# Patient Record
Sex: MALE | Race: WHITE | NOT HISPANIC OR LATINO | Employment: STUDENT | URBAN - METROPOLITAN AREA
[De-identification: names, ages, dates, MRNs, and addresses within clinical notes are randomized per-mention and may not be internally consistent; named-entity substitution may affect disease eponyms.]

---

## 2020-08-27 ENCOUNTER — OFFICE VISIT (OUTPATIENT)
Dept: FAMILY MEDICINE CLINIC | Facility: CLINIC | Age: 25
End: 2020-08-27
Payer: COMMERCIAL

## 2020-08-27 VITALS
HEART RATE: 60 BPM | WEIGHT: 160 LBS | HEIGHT: 71 IN | SYSTOLIC BLOOD PRESSURE: 120 MMHG | DIASTOLIC BLOOD PRESSURE: 80 MMHG | OXYGEN SATURATION: 100 % | BODY MASS INDEX: 22.4 KG/M2

## 2020-08-27 DIAGNOSIS — E73.9 LACTOSE INTOLERANCE: ICD-10-CM

## 2020-08-27 DIAGNOSIS — K58.0 IRRITABLE BOWEL SYNDROME WITH DIARRHEA: ICD-10-CM

## 2020-08-27 DIAGNOSIS — K59.1 FUNCTIONAL DIARRHEA: Primary | ICD-10-CM

## 2020-08-27 PROCEDURE — 3008F BODY MASS INDEX DOCD: CPT | Performed by: FAMILY MEDICINE

## 2020-08-27 PROCEDURE — 3725F SCREEN DEPRESSION PERFORMED: CPT | Performed by: FAMILY MEDICINE

## 2020-08-27 PROCEDURE — 1036F TOBACCO NON-USER: CPT | Performed by: FAMILY MEDICINE

## 2020-08-27 PROCEDURE — 99203 OFFICE O/P NEW LOW 30 MIN: CPT | Performed by: FAMILY MEDICINE

## 2020-08-27 NOTE — PROGRESS NOTES
Assessment/Plan: patient go for studies as listed below  Patient use Metamucil daily  Follow-up in 1 month  Diagnoses and all orders for this visit:    Functional diarrhea  -     Comprehensive metabolic panel; Future  -     CBC and differential; Future  -     TSH, 3rd generation with Free T4 reflex; Future  -     Celiac Disease Antibody Profile; Future  -     Inflammatory bowel disease panel; Future  -     Lipase; Future  -     UA w Reflex to Microscopic w Reflex to Culture -Lab Collect  -     5 HIAA, urine, quantitative, 24 hour; Future  -     Stool culture; Future  -     White Blood Cells, Stool by Gram Stain; Future  -     H  pylori antigen, stool; Future  -     Ova and parasite examination; Future  -     Clostridium difficile toxin by PCR; Future    Irritable bowel syndrome with diarrhea  -     Comprehensive metabolic panel; Future  -     CBC and differential; Future  -     TSH, 3rd generation with Free T4 reflex; Future  -     Celiac Disease Antibody Profile; Future  -     Inflammatory bowel disease panel; Future  -     Lipase; Future  -     UA w Reflex to Microscopic w Reflex to Culture -Lab Collect  -     5 HIAA, urine, quantitative, 24 hour; Future  -     Stool culture; Future  -     White Blood Cells, Stool by Gram Stain; Future  -     H  pylori antigen, stool; Future  -     Ova and parasite examination; Future  -     Clostridium difficile toxin by PCR; Future    Lactose intolerance  -     Comprehensive metabolic panel; Future  -     CBC and differential; Future  -     TSH, 3rd generation with Free T4 reflex; Future  -     Celiac Disease Antibody Profile; Future  -     Inflammatory bowel disease panel; Future  -     Lipase; Future  -     UA w Reflex to Microscopic w Reflex to Culture -Lab Collect  -     5 HIAA, urine, quantitative, 24 hour; Future  -     Stool culture; Future  -     White Blood Cells, Stool by Gram Stain; Future  -     H  pylori antigen, stool;  Future  -     Ova and parasite examination; Future  -     Clostridium difficile toxin by PCR; Future            Subjective:        Patient ID: Gonzalo Hernandez is a 27 y o  male  Patient is here as a new patient to establish care  Past medical history surgical history allergies medications family history and social history all reviewed at the present time  Patient with history of lactose intolerance  Patient with watery diarrhea  Patient with lower abdominal pain bilaterally  This occurs with bowel movements  The occasional vomiting noted  No nausea  No other chest pain shortness of breath or difficulty with urination  No hematochezia  Urination is normal   Patient does use Imodium intermittently  Tums use occasionally  Patient will sometimes have multiple bowel movements in the same day  Patient's father with celiac disease  The no colon cancer  The following portions of the patient's history were reviewed and updated as appropriate: allergies, current medications, past family history, past medical history, past social history, past surgical history and problem list       Review of Systems   Constitutional: Negative  HENT: Negative  Eyes: Negative  Respiratory: Negative  Cardiovascular: Negative  Gastrointestinal: Positive for abdominal pain, diarrhea, nausea and vomiting  Endocrine: Negative  Genitourinary: Negative  Musculoskeletal: Negative  Skin: Negative  Allergic/Immunologic: Negative  Neurological: Negative  Hematological: Negative  Psychiatric/Behavioral: Negative  Objective:      BMI Counseling: Body mass index is 22 32 kg/m²  The BMI is above normal  Nutrition recommendations include decreasing portion sizes  Exercise recommendations include moderate physical activity 150 minutes/week  Depression Screening and Follow-up Plan: Patient's depression screening was positive with a PHQ-2 score of 0  Clincally patient does not have depression   No treatment is required  /80 (BP Location: Left arm, Patient Position: Sitting, Cuff Size: Standard)   Pulse 60   Ht 5' 11" (1 803 m)   Wt 72 6 kg (160 lb)   SpO2 100%   BMI 22 32 kg/m²          Physical Exam  Vitals signs and nursing note reviewed  Constitutional:       General: He is not in acute distress  Appearance: Normal appearance  He is well-developed  He is not diaphoretic  HENT:      Head: Normocephalic and atraumatic  Right Ear: Tympanic membrane, ear canal and external ear normal       Left Ear: Tympanic membrane, ear canal and external ear normal       Nose: Nose normal  No congestion  Eyes:      General: No scleral icterus  Right eye: No discharge  Left eye: No discharge  Pupils: Pupils are equal, round, and reactive to light  Neck:      Musculoskeletal: Normal range of motion and neck supple  Thyroid: No thyromegaly  Cardiovascular:      Rate and Rhythm: Normal rate and regular rhythm  Heart sounds: Normal heart sounds  No murmur  No friction rub  No gallop  Pulmonary:      Effort: Pulmonary effort is normal  No respiratory distress  Breath sounds: Normal breath sounds  No wheezing or rales  Chest:      Chest wall: No tenderness  Abdominal:      General: Bowel sounds are normal  There is no distension  Palpations: Abdomen is soft  Tenderness: There is no abdominal tenderness  There is no guarding or rebound  Musculoskeletal: Normal range of motion  General: No tenderness  Lymphadenopathy:      Cervical: No cervical adenopathy  Skin:     General: Skin is warm and dry  Capillary Refill: Capillary refill takes 2 to 3 seconds  Coloration: Skin is not pale  Findings: No erythema or rash  Neurological:      General: No focal deficit present  Mental Status: He is alert and oriented to person, place, and time  Cranial Nerves: No cranial nerve deficit  Motor: No abnormal muscle tone  Coordination: Coordination normal    Psychiatric:         Mood and Affect: Mood normal          Behavior: Behavior normal          Thought Content:  Thought content normal          Judgment: Judgment normal

## 2020-09-08 ENCOUNTER — APPOINTMENT (OUTPATIENT)
Dept: LAB | Facility: MEDICAL CENTER | Age: 25
End: 2020-09-08
Payer: COMMERCIAL

## 2020-09-08 DIAGNOSIS — K59.1 FUNCTIONAL DIARRHEA: ICD-10-CM

## 2020-09-08 DIAGNOSIS — E73.9 LACTOSE INTOLERANCE: ICD-10-CM

## 2020-09-08 DIAGNOSIS — K58.0 IRRITABLE BOWEL SYNDROME WITH DIARRHEA: ICD-10-CM

## 2020-09-08 LAB
ALBUMIN SERPL BCP-MCNC: 4.4 G/DL (ref 3.5–5)
ALP SERPL-CCNC: 46 U/L (ref 46–116)
ALT SERPL W P-5'-P-CCNC: 23 U/L (ref 12–78)
ANION GAP SERPL CALCULATED.3IONS-SCNC: 5 MMOL/L (ref 4–13)
AST SERPL W P-5'-P-CCNC: 16 U/L (ref 5–45)
BASOPHILS # BLD AUTO: 0.06 THOUSANDS/ΜL (ref 0–0.1)
BASOPHILS NFR BLD AUTO: 1 % (ref 0–1)
BILIRUB SERPL-MCNC: 0.39 MG/DL (ref 0.2–1)
BILIRUB UR QL STRIP: NEGATIVE
BUN SERPL-MCNC: 8 MG/DL (ref 5–25)
CALCIUM SERPL-MCNC: 9.7 MG/DL (ref 8.3–10.1)
CHLORIDE SERPL-SCNC: 108 MMOL/L (ref 100–108)
CLARITY UR: CLEAR
CO2 SERPL-SCNC: 29 MMOL/L (ref 21–32)
COLOR UR: YELLOW
CREAT SERPL-MCNC: 0.91 MG/DL (ref 0.6–1.3)
EOSINOPHIL # BLD AUTO: 0.23 THOUSAND/ΜL (ref 0–0.61)
EOSINOPHIL NFR BLD AUTO: 5 % (ref 0–6)
ERYTHROCYTE [DISTWIDTH] IN BLOOD BY AUTOMATED COUNT: 11.7 % (ref 11.6–15.1)
GFR SERPL CREATININE-BSD FRML MDRD: 119 ML/MIN/1.73SQ M
GLUCOSE SERPL-MCNC: 93 MG/DL (ref 65–140)
GLUCOSE UR STRIP-MCNC: NEGATIVE MG/DL
HCT VFR BLD AUTO: 48.3 % (ref 36.5–49.3)
HGB BLD-MCNC: 16.4 G/DL (ref 12–17)
HGB UR QL STRIP.AUTO: NEGATIVE
IMM GRANULOCYTES # BLD AUTO: 0.02 THOUSAND/UL (ref 0–0.2)
IMM GRANULOCYTES NFR BLD AUTO: 0 % (ref 0–2)
KETONES UR STRIP-MCNC: NEGATIVE MG/DL
LEUKOCYTE ESTERASE UR QL STRIP: NEGATIVE
LIPASE SERPL-CCNC: 155 U/L (ref 73–393)
LYMPHOCYTES # BLD AUTO: 1.46 THOUSANDS/ΜL (ref 0.6–4.47)
LYMPHOCYTES NFR BLD AUTO: 30 % (ref 14–44)
MCH RBC QN AUTO: 31.2 PG (ref 26.8–34.3)
MCHC RBC AUTO-ENTMCNC: 34 G/DL (ref 31.4–37.4)
MCV RBC AUTO: 92 FL (ref 82–98)
MONOCYTES # BLD AUTO: 0.3 THOUSAND/ΜL (ref 0.17–1.22)
MONOCYTES NFR BLD AUTO: 6 % (ref 4–12)
NEUTROPHILS # BLD AUTO: 2.76 THOUSANDS/ΜL (ref 1.85–7.62)
NEUTS SEG NFR BLD AUTO: 58 % (ref 43–75)
NITRITE UR QL STRIP: NEGATIVE
NRBC BLD AUTO-RTO: 0 /100 WBCS
PH UR STRIP.AUTO: 7.5 [PH]
PLATELET # BLD AUTO: 192 THOUSANDS/UL (ref 149–390)
PMV BLD AUTO: 11.9 FL (ref 8.9–12.7)
POTASSIUM SERPL-SCNC: 4.4 MMOL/L (ref 3.5–5.3)
PROT SERPL-MCNC: 7.6 G/DL (ref 6.4–8.2)
PROT UR STRIP-MCNC: NEGATIVE MG/DL
RBC # BLD AUTO: 5.26 MILLION/UL (ref 3.88–5.62)
SODIUM SERPL-SCNC: 142 MMOL/L (ref 136–145)
SP GR UR STRIP.AUTO: 1.02 (ref 1–1.03)
TSH SERPL DL<=0.05 MIU/L-ACNC: 1.1 UIU/ML (ref 0.36–3.74)
UROBILINOGEN UR QL STRIP.AUTO: 0.2 E.U./DL
WBC # BLD AUTO: 4.83 THOUSAND/UL (ref 4.31–10.16)

## 2020-09-08 PROCEDURE — 81003 URINALYSIS AUTO W/O SCOPE: CPT | Performed by: FAMILY MEDICINE

## 2020-09-08 PROCEDURE — 36415 COLL VENOUS BLD VENIPUNCTURE: CPT

## 2020-09-08 PROCEDURE — 83516 IMMUNOASSAY NONANTIBODY: CPT

## 2020-09-08 PROCEDURE — 86671 FUNGUS NES ANTIBODY: CPT

## 2020-09-08 PROCEDURE — 86255 FLUORESCENT ANTIBODY SCREEN: CPT

## 2020-09-08 PROCEDURE — 85025 COMPLETE CBC W/AUTO DIFF WBC: CPT

## 2020-09-08 PROCEDURE — 84443 ASSAY THYROID STIM HORMONE: CPT

## 2020-09-08 PROCEDURE — 80053 COMPREHEN METABOLIC PANEL: CPT

## 2020-09-08 PROCEDURE — 82784 ASSAY IGA/IGD/IGG/IGM EACH: CPT

## 2020-09-08 PROCEDURE — 83690 ASSAY OF LIPASE: CPT

## 2020-09-09 LAB
ENDOMYSIUM IGA SER QL: NEGATIVE
GLIADIN PEPTIDE IGA SER-ACNC: 2 UNITS (ref 0–19)
GLIADIN PEPTIDE IGG SER-ACNC: 1 UNITS (ref 0–19)
IGA SERPL-MCNC: 176 MG/DL (ref 90–386)
TTG IGA SER-ACNC: <2 U/ML (ref 0–3)
TTG IGG SER-ACNC: <2 U/ML (ref 0–5)

## 2020-09-10 ENCOUNTER — APPOINTMENT (OUTPATIENT)
Dept: LAB | Facility: MEDICAL CENTER | Age: 25
End: 2020-09-10
Payer: COMMERCIAL

## 2020-09-10 DIAGNOSIS — E73.9 LACTOSE INTOLERANCE: ICD-10-CM

## 2020-09-10 DIAGNOSIS — K58.0 IRRITABLE BOWEL SYNDROME WITH DIARRHEA: ICD-10-CM

## 2020-09-10 DIAGNOSIS — K59.1 FUNCTIONAL DIARRHEA: ICD-10-CM

## 2020-09-10 LAB
BAKER'S YEAST IGG QN IA: 16 UNITS (ref 0–50)
CHITOBIOSIDE IGA SERPL IA-ACNC: 14 UNITS (ref 0–90)
IBD COMMENTS: NORMAL
LAMINARIBIOSIDE IGG SERPL IA-ACNC: 6 UNITS (ref 0–60)
MANNOBIOSIDE IGG SERPL IA-ACNC: 10 UNITS (ref 0–100)
P-ANCA ATYPICAL SER QL IF: NEGATIVE

## 2020-09-10 PROCEDURE — 87045 FECES CULTURE AEROBIC BACT: CPT | Performed by: FAMILY MEDICINE

## 2020-09-10 PROCEDURE — 36415 COLL VENOUS BLD VENIPUNCTURE: CPT | Performed by: FAMILY MEDICINE

## 2020-09-10 PROCEDURE — 87177 OVA AND PARASITES SMEARS: CPT

## 2020-09-10 PROCEDURE — 87209 SMEAR COMPLEX STAIN: CPT

## 2020-09-10 PROCEDURE — 87427 SHIGA-LIKE TOXIN AG IA: CPT

## 2020-09-10 PROCEDURE — 87338 HPYLORI STOOL AG IA: CPT

## 2020-09-10 PROCEDURE — 87046 STOOL CULTR AEROBIC BACT EA: CPT

## 2020-09-10 PROCEDURE — 83497 ASSAY OF 5-HIAA: CPT

## 2020-09-10 PROCEDURE — 87205 SMEAR GRAM STAIN: CPT

## 2020-09-11 LAB
C DIFF TOX GENS STL QL NAA+PROBE: NEGATIVE
H PYLORI AG STL QL IA: NEGATIVE
WBC STL QL MICRO: NORMAL

## 2020-09-14 DIAGNOSIS — K59.1 FUNCTIONAL DIARRHEA: Primary | ICD-10-CM

## 2020-09-15 LAB — O+P STL CONC: NORMAL

## 2020-09-19 LAB
5OH-INDOLEACETATE 24H UR-MCNC: 3.5 MG/L
5OH-INDOLEACETATE 24H UR-MRATE: 2.1 MG/24 HR (ref 0–14.9)

## 2020-09-21 LAB — MISCELLANEOUS LAB TEST RESULT: NORMAL

## 2020-09-28 ENCOUNTER — OFFICE VISIT (OUTPATIENT)
Dept: FAMILY MEDICINE CLINIC | Facility: CLINIC | Age: 25
End: 2020-09-28
Payer: COMMERCIAL

## 2020-09-28 VITALS
DIASTOLIC BLOOD PRESSURE: 82 MMHG | WEIGHT: 158 LBS | BODY MASS INDEX: 22.12 KG/M2 | SYSTOLIC BLOOD PRESSURE: 120 MMHG | HEIGHT: 71 IN

## 2020-09-28 DIAGNOSIS — K58.0 IRRITABLE BOWEL SYNDROME WITH DIARRHEA: Primary | ICD-10-CM

## 2020-09-28 DIAGNOSIS — K59.1 FUNCTIONAL DIARRHEA: ICD-10-CM

## 2020-09-28 DIAGNOSIS — E73.9 LACTOSE INTOLERANCE: ICD-10-CM

## 2020-09-28 PROCEDURE — 1036F TOBACCO NON-USER: CPT | Performed by: FAMILY MEDICINE

## 2020-09-28 PROCEDURE — 99213 OFFICE O/P EST LOW 20 MIN: CPT | Performed by: FAMILY MEDICINE

## 2020-09-28 NOTE — PROGRESS NOTES
Assessment/Plan:  Guidance given overall  Labs discussed with the patient  All testing negative at this point  Patient will continue with Metamucil as patient has seen improvement  To consider SSRI cetera  To consider GI referral if symptoms would persist   Patient will follow-up as needed       Diagnoses and all orders for this visit:    Irritable bowel syndrome with diarrhea    Functional diarrhea    Lactose intolerance    Other orders  -     psyllium (METAMUCIL) 58 6 % powder; Take 1 packet by mouth 3 (three) times a day            Subjective:        Patient ID: Andrew Lee is a 23 y o  male  Patient is here to follow-up on lower abdominal discomfort as well as diarrhea and IBS  Patient's stools have been more formed overall  No hematochezia or melena noted  No fevers or chills  Abdominal pain has improved overall  Patient with occasional discomfort  The following portions of the patient's history were reviewed and updated as appropriate: allergies, current medications, past family history, past medical history, past social history, past surgical history and problem list       Review of Systems   Constitutional: Negative  HENT: Negative  Eyes: Negative  Respiratory: Negative  Cardiovascular: Negative  Gastrointestinal: Positive for diarrhea  Endocrine: Negative  Genitourinary: Negative  Musculoskeletal: Negative  Skin: Negative  Allergic/Immunologic: Negative  Neurological: Negative  Hematological: Negative  Psychiatric/Behavioral: Negative  Objective:        Depression Screening and Follow-up Plan: Clincally patient does not have depression  No treatment is required  /82 (BP Location: Right arm, Patient Position: Sitting, Cuff Size: Standard)   Ht 5' 11" (1 803 m)   Wt 71 7 kg (158 lb)   BMI 22 04 kg/m²          Physical Exam  Vitals signs and nursing note reviewed     Constitutional:       General: He is not in acute distress  Appearance: Normal appearance  He is well-developed  He is not ill-appearing, toxic-appearing or diaphoretic  HENT:      Head: Normocephalic and atraumatic  Right Ear: Tympanic membrane, ear canal and external ear normal       Left Ear: Tympanic membrane, ear canal and external ear normal       Nose: Nose normal  No congestion or rhinorrhea  Mouth/Throat:      Pharynx: No oropharyngeal exudate  Eyes:      General: No scleral icterus  Right eye: No discharge  Left eye: No discharge  Pupils: Pupils are equal, round, and reactive to light  Neck:      Musculoskeletal: Normal range of motion and neck supple  Thyroid: No thyromegaly  Cardiovascular:      Rate and Rhythm: Normal rate and regular rhythm  Heart sounds: Normal heart sounds  No murmur  No friction rub  No gallop  Pulmonary:      Effort: Pulmonary effort is normal  No respiratory distress  Breath sounds: Normal breath sounds  No wheezing or rales  Chest:      Chest wall: No tenderness  Abdominal:      General: Bowel sounds are normal  There is no distension  Palpations: Abdomen is soft  Tenderness: There is no abdominal tenderness  There is no guarding or rebound  Musculoskeletal: Normal range of motion  General: No tenderness  Lymphadenopathy:      Cervical: No cervical adenopathy  Skin:     General: Skin is warm and dry  Coloration: Skin is not pale  Findings: No erythema or rash  Neurological:      Mental Status: He is alert and oriented to person, place, and time  Cranial Nerves: No cranial nerve deficit  Motor: No abnormal muscle tone  Coordination: Coordination normal    Psychiatric:         Behavior: Behavior normal          Thought Content:  Thought content normal          Judgment: Judgment normal

## 2020-09-30 ENCOUNTER — TELEPHONE (OUTPATIENT)
Dept: FAMILY MEDICINE CLINIC | Facility: CLINIC | Age: 25
End: 2020-09-30

## 2021-05-10 ENCOUNTER — IMMUNIZATIONS (OUTPATIENT)
Dept: FAMILY MEDICINE CLINIC | Facility: HOSPITAL | Age: 26
End: 2021-05-10

## 2021-05-10 DIAGNOSIS — Z23 ENCOUNTER FOR IMMUNIZATION: Primary | ICD-10-CM

## 2021-05-10 PROCEDURE — 0011A SARS-COV-2 / COVID-19 MRNA VACCINE (MODERNA) 100 MCG: CPT

## 2021-05-10 PROCEDURE — 91301 SARS-COV-2 / COVID-19 MRNA VACCINE (MODERNA) 100 MCG: CPT

## 2021-06-05 ENCOUNTER — IMMUNIZATIONS (OUTPATIENT)
Dept: FAMILY MEDICINE CLINIC | Facility: HOSPITAL | Age: 26
End: 2021-06-05

## 2021-06-05 DIAGNOSIS — Z23 ENCOUNTER FOR IMMUNIZATION: Primary | ICD-10-CM

## 2021-06-05 PROCEDURE — 91301 SARS-COV-2 / COVID-19 MRNA VACCINE (MODERNA) 100 MCG: CPT

## 2021-06-05 PROCEDURE — 0012A SARS-COV-2 / COVID-19 MRNA VACCINE (MODERNA) 100 MCG: CPT

## 2024-09-13 ENCOUNTER — OFFICE VISIT (OUTPATIENT)
Dept: FAMILY MEDICINE CLINIC | Facility: CLINIC | Age: 29
End: 2024-09-13
Payer: COMMERCIAL

## 2024-09-13 VITALS
OXYGEN SATURATION: 100 % | WEIGHT: 149.6 LBS | RESPIRATION RATE: 16 BRPM | TEMPERATURE: 97.4 F | BODY MASS INDEX: 21.42 KG/M2 | HEIGHT: 70 IN | DIASTOLIC BLOOD PRESSURE: 80 MMHG | SYSTOLIC BLOOD PRESSURE: 116 MMHG | HEART RATE: 65 BPM

## 2024-09-13 DIAGNOSIS — Z00.00 ROUTINE ADULT HEALTH MAINTENANCE: ICD-10-CM

## 2024-09-13 DIAGNOSIS — Z30.09 STERILIZATION CONSULT: Primary | ICD-10-CM

## 2024-09-13 PROCEDURE — 99395 PREV VISIT EST AGE 18-39: CPT | Performed by: FAMILY MEDICINE

## 2024-09-17 ENCOUNTER — TELEPHONE (OUTPATIENT)
Age: 29
End: 2024-09-17

## 2024-09-17 NOTE — PROGRESS NOTES
Assessment/Plan:    28 y/o male with: Annual well visit. Discussed various safety and health maintenance issues including healthy diet like the Mediterranean diet, exercise, ample sleep, stress reduction, and healthy weight as tolerated. Discussed supportive care and return parameters. Pt requests referral to Urology for vasectomy consult as well. Will check labs.    No problem-specific Assessment & Plan notes found for this encounter.       Diagnoses and all orders for this visit:    Sterilization consult  -     Ambulatory Referral to Urology; Future  -     CBC and differential; Future  -     Comprehensive metabolic panel; Future  -     TSH, 3rd generation with Free T4 reflex; Future  -     Lipid Panel with Direct LDL reflex; Future  -     ABO/Rh; Future  -     CBC and differential  -     Comprehensive metabolic panel  -     TSH, 3rd generation with Free T4 reflex  -     Lipid Panel with Direct LDL reflex  -     ABO/Rh    Routine adult health maintenance  -     CBC and differential; Future  -     Comprehensive metabolic panel; Future  -     TSH, 3rd generation with Free T4 reflex; Future  -     Lipid Panel with Direct LDL reflex; Future  -     ABO/Rh; Future  -     CBC and differential  -     Comprehensive metabolic panel  -     TSH, 3rd generation with Free T4 reflex  -     Lipid Panel with Direct LDL reflex  -     ABO/Rh          Subjective:     Chief Complaint   Patient presents with    Establish Care     Pt presents today for establishing care. Pt concern about Wart on Right Foot. Pt also wants to discuss about getting referral to urology to get vasectomy. No other concerns. [facility]        Patient ID: Kenneth Osorio is a 29 y.o. male.    Patient is a 28 y/o male who presents for annual well visit admits being active eats and sleeps well.        The following portions of the patient's history were reviewed and updated as appropriate: allergies, current medications, past family history, past medical history, past  "social history, past surgical history and problem list.    Review of Systems   Constitutional: Negative.    HENT: Negative.     Eyes: Negative.    Respiratory: Negative.     Cardiovascular: Negative.    Gastrointestinal: Negative.    Endocrine: Negative.    Genitourinary: Negative.    Musculoskeletal: Negative.    Allergic/Immunologic: Negative.    Neurological: Negative.    Hematological: Negative.    Psychiatric/Behavioral: Negative.     All other systems reviewed and are negative.        Objective:      /80 (BP Location: Right arm, Patient Position: Sitting, Cuff Size: Standard)   Pulse 65   Temp (!) 97.4 °F (36.3 °C) (Temporal)   Resp 16   Ht 5' 10.25\" (1.784 m)   Wt 67.9 kg (149 lb 9.6 oz)   SpO2 100%   BMI 21.31 kg/m²          Physical Exam  Constitutional:       Appearance: He is well-developed.   HENT:      Head: Atraumatic.      Right Ear: External ear normal.      Left Ear: External ear normal.   Eyes:      Extraocular Movements: EOM normal.      Conjunctiva/sclera: Conjunctivae normal.      Pupils: Pupils are equal, round, and reactive to light.   Cardiovascular:      Rate and Rhythm: Normal rate and regular rhythm.      Heart sounds: Normal heart sounds.   Pulmonary:      Effort: Pulmonary effort is normal. No respiratory distress.      Breath sounds: Normal breath sounds.   Abdominal:      General: There is no distension.      Palpations: Abdomen is soft.      Tenderness: There is no abdominal tenderness. There is no guarding or rebound.   Musculoskeletal:         General: Normal range of motion.      Cervical back: Normal range of motion.   Skin:     General: Skin is warm and dry.   Neurological:      Mental Status: He is alert and oriented to person, place, and time.      Cranial Nerves: No cranial nerve deficit.   Psychiatric:         Mood and Affect: Mood and affect normal.         Behavior: Behavior normal.         Thought Content: Thought content normal.         Judgment: Judgment " normal.

## 2024-09-17 NOTE — TELEPHONE ENCOUNTER
New Patient    What is the reason for the patient’s appointment?: NP calling to schedule referral for vas consult. Pt scheduled for next available at [facility] on 11/13/24 @ 10am.     What office location does the patient prefer?: [facility]

## 2024-10-13 PROBLEM — Z00.00 ROUTINE ADULT HEALTH MAINTENANCE: Status: RESOLVED | Noted: 2024-09-13 | Resolved: 2024-10-13

## 2024-11-13 ENCOUNTER — CONSULT (OUTPATIENT)
Dept: UROLOGY | Facility: CLINIC | Age: 29
End: 2024-11-13
Payer: COMMERCIAL

## 2024-11-13 VITALS
HEART RATE: 67 BPM | HEIGHT: 70 IN | BODY MASS INDEX: 21.47 KG/M2 | WEIGHT: 150 LBS | OXYGEN SATURATION: 100 % | SYSTOLIC BLOOD PRESSURE: 116 MMHG | DIASTOLIC BLOOD PRESSURE: 80 MMHG

## 2024-11-13 DIAGNOSIS — Z30.09 STERILIZATION CONSULT: ICD-10-CM

## 2024-11-13 DIAGNOSIS — Z30.2 ENCOUNTER FOR STERILIZATION: Primary | ICD-10-CM

## 2024-11-13 PROCEDURE — 99203 OFFICE O/P NEW LOW 30 MIN: CPT

## 2024-11-13 RX ORDER — LORAZEPAM 1 MG/1
1 TABLET ORAL
Qty: 1 TABLET | Refills: 0 | Status: SHIPPED | OUTPATIENT
Start: 2024-11-13

## 2024-11-13 RX ORDER — MULTIVITAMIN
1 CAPSULE ORAL DAILY
COMMUNITY

## 2024-11-13 NOTE — PROGRESS NOTES
11/13/2024      Chief Complaint   Patient presents with    New Patient Visit    Vasectomy    Consult         Assessment and Plan    29 y.o. male managed by new patient    1. Desire for elective sterilization  - exam today as below  - informed consent signed today  - continue contraception  - rx Ativan with  to/from on appt date  - shave scrotal/pubic hair day prior to appt date    Return for vasectomy as scheduled.      History of Present Illness  Kenneth Osorio is a 29 y.o. 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  Patient works as a   Voiding issues- none  Bleeding issues/thinners- none  Allergies to lidocaine/marcaine/betadine/chromic- none    The patient presents requesting elective sterilization vasectomy.     We discussed that vasectomy is in operation performed in the office in order to provide elective sterilization. This procedure should be considered a permanent option. Although there are subspecialists who perform vasectomy reversals, these operations are not 100% successful and are often not covered by insurance meaning they can come with a large out-of-pocket cost. The patient understands this.     We reviewed the procedure in depth. Risks and benefits of the procedure were discussed and reviewed. Risks described included hematoma formation, Infection, sperm granuloma, epididymitis, post-vasectomy pain syndrome, and vasectomy failure  Informed consent was obtained in the office today. The patient was prescribed a benzodiazepine to take one hour prior to the procedure to assist with his comfort.  He understands that he will require transportation to and from the office that day if he is to use the benzodiazepine.       He also understands he will require  "semen analysis testing at 3 months post procedure to ensure full sterilization.  In the interim, he will require contraception during intercourse to avoid an undesired pregnancy.     Usually, patients are out of work for 2-3 days. We recommend tight fitting scrotal support following the procedure along with ice packs applied to the scrotum 15 minutes on and 15 minutes off for the first 24 hours. We discussed that we do send the patient home with short course of anti-inflammatory and/or narcotic pain medication.     After this discussion, the patient agrees to proceed. We will schedule him in the near future.    He agrees to take oral sedative -Ativan 1 mg one hour prior to procedure.        Review of Systems             Vitals  Vitals:    11/13/24 1000   BP: 116/80   BP Location: Left arm   Patient Position: Sitting   Cuff Size: Adult   Pulse: 67   SpO2: 100%   Weight: 68 kg (150 lb)   Height: 5' 10.25\" (1.784 m)       Physical Exam    General: Well appearing, no distress, appears stated age.  HEENT:  Normocephalic, atraumatic. Conjunctiva clear.  Respiratory: Nonlabored respirations, no wheeze or cough  Abdomen:  Soft nontender without hernia. No suprapubic or CVA tenderness.  Genitourinary: Circumcised penis, normal phallus, orthotopic patent meatus.  Testes smooth descended bilaterally into the scrotum nontender with no palpable mass.  Palpably normal spermatic cord and vas deferens bilaterally.  Musculoskeletal:  Normal range of motion and gait without defecit.  Neuro: No gross neurologic defect or abnormality. Steady unassisted gait. Speech and affect normal.  Dermatologic: skin warm, dry; no rash erythema or ecchymosis      Past History  Past Medical History:   Diagnosis Date    Inflammatory bowel disease      Social History     Socioeconomic History    Marital status: /Civil Union     Spouse name: None    Number of children: None    Years of education: None    Highest education level: High school " graduate   Occupational History    Occupation: student   Tobacco Use    Smoking status: Former     Types: Cigarettes    Smokeless tobacco: Never   Vaping Use    Vaping status: Former   Substance and Sexual Activity    Alcohol use: Yes    Drug use: Never    Sexual activity: Yes     Partners: Female   Other Topics Concern    None   Social History Narrative    None     Social Drivers of Health     Financial Resource Strain: Low Risk  (9/28/2020)    Overall Financial Resource Strain (CARDIA)     Difficulty of Paying Living Expenses: Not hard at all   Food Insecurity: No Food Insecurity (9/28/2020)    Hunger Vital Sign     Worried About Running Out of Food in the Last Year: Never true     Ran Out of Food in the Last Year: Never true   Transportation Needs: No Transportation Needs (9/28/2020)    PRAPARE - Transportation     Lack of Transportation (Medical): No     Lack of Transportation (Non-Medical): No   Physical Activity: Sufficiently Active (8/27/2020)    Exercise Vital Sign     Days of Exercise per Week: 3 days     Minutes of Exercise per Session: 90 min   Stress: Stress Concern Present (8/27/2020)    [employer] - Occupational Stress Questionnaire     Feeling of Stress : To some extent   Social Connections: Socially Isolated (8/27/2020)    Social Connection and Isolation Panel [NHANES]     Frequency of Communication with Friends and Family: More than three times a week     Frequency of Social Gatherings with Friends and Family: More than three times a week     Attends Sikhism Services: Never     Active Member of Clubs or Organizations: No     Attends Club or Organization Meetings: Never     Marital Status: Never    Intimate Partner Violence: Not At Risk (8/27/2020)    Humiliation, Afraid, Rape, and Kick questionnaire     Fear of Current or Ex-Partner: No     Emotionally Abused: No     Physically Abused: No     Sexually Abused: No   Housing Stability: Not on file     Social  "History     Tobacco Use   Smoking Status Former    Types: Cigarettes   Smokeless Tobacco Never     Family History   Problem Relation Age of Onset    Diabetes Mother     Hypertension Mother     Celiac disease Father     No Known Problems Brother     No Known Problems Maternal Grandmother     No Known Problems Maternal Grandfather     No Known Problems Paternal Grandmother     Dementia Paternal Grandfather        The following portions of the patient's history were reviewed and updated as appropriate: allergies, current medications, past medical history, past social history, past surgical history and problem list.    Results  No results found for this or any previous visit (from the past hour).]  No results found for: \"PSA\"  Lab Results   Component Value Date    CALCIUM 9.7 09/08/2020    K 4.4 09/08/2020    CO2 29 09/08/2020     09/08/2020    BUN 8 09/08/2020    CREATININE 0.91 09/08/2020     Lab Results   Component Value Date    WBC 4.83 09/08/2020    HGB 16.4 09/08/2020    HCT 48.3 09/08/2020    MCV 92 09/08/2020     09/08/2020       "

## 2024-12-30 ENCOUNTER — TELEPHONE (OUTPATIENT)
Age: 29
End: 2024-12-30

## 2024-12-30 NOTE — TELEPHONE ENCOUNTER
Pt called to stated that his insurance is stating we are out of network,  wanted to make sure that we excepted his insurance.  I did advise that we do except [employer]. [employer]

## 2025-01-07 ENCOUNTER — TELEPHONE (OUTPATIENT)
Dept: UROLOGY | Facility: CLINIC | Age: 30
End: 2025-01-07

## 2025-01-07 NOTE — TELEPHONE ENCOUNTER
Called patient and informed him that his appointment on 1/17/25 is being moved to 2:30. Patient asked what time he should take the ativan. Informed the pt that he should take this at least 30 min prior to his procedure. Patient confirmed understanding and was thankful for the call.

## 2025-01-13 ENCOUNTER — TELEPHONE (OUTPATIENT)
Age: 30
End: 2025-01-13

## 2025-01-13 NOTE — TELEPHONE ENCOUNTER
Nico from [employer] calling in regards to appt scheduled for vasectomy.     Insurance requesting group NPI number as well as the providers NPI. Provided information to representative and no further questions at this time.

## 2025-01-13 NOTE — TELEPHONE ENCOUNTER
FYI - Pt called very frustrated with insurance rep whom he had spoken with and wants to cancel office procedure as he's not getting straight answer if insurance is out of network,I offered to leave vas scheduled if this matter gets straightened before 01/17/25 he said he doesn't want to deal with this insurance anymore & just cancel appointment.